# Patient Record
(demographics unavailable — no encounter records)

---

## 2024-10-02 NOTE — HISTORY OF PRESENT ILLNESS
[FreeTextEntry1] : 77-year-old man with a very complex history.  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 He also has a past medical history of hypertension; hyperlipidemia; seizure disorder; throat carcinoma status post radiation; abdominal aneurysm repair; MI status post CABG, on aspirin; and right-sided carotid endarterectomy at 2017 as well as hemicolectomy and cholecystectomy.  Presented to outside hospital after he developed a left temporal fistula.  There was suspicion for skin cancer.  Biopsy showed granulation tissue, and fascia was tracking down all the way to previous cranioplasty.  He underwent direct browlift and upper blepharoplasty and revision of the left cranioplasty using Rj titanium mesh at an outside hospital as well as temporalis flap and mineralized bone matrix.  He then underwent initial operation for local flap on 9/19 for removal of the mesh and admitted to [facility] for antibiotic treatment.  After 4 days, the flap became dusky necrotic with purulent material discharge the surgeon at OSH returned to the OR on 10/1 for removal of the mesh, debridement of bone matrix, but the scalp tension was too high and the surgeon at the outside hospital could not close it.  All cultures from [facility] were growing proteus mirabilis, and he was on broad-spectrum antibiotics.  The patient was referred to the Neuroplastic service at [facility] for workup and management of this complicated scalp wound, wound dehiscence, intracranial abscess, as well as epidural empyema and osteomyelitis. Referred by Dr. Luis Black  On 10/11/2023 underwent Resection of damaged scalp surrounding the open foul smelling draining wound, Removal of old infected polymethyl methacrylate cranial implant that was placed over a large left frontotemporoparietal defect at his original craniotomy in the 60s. Creation of complex left-sided pedicle scalp flap for midline rotation based on the left superficial temporal artery and occipital artery to fill in half of the scalp defect and Full-thickness skin graft harvested from the abdomen to cover the right superior donor site. patient Tolerated the procedure well and discharged to rehab on Ceftriaxone 2 grams IV q12. Vancomycin 1 gram IV q12 + Metronidazole 500 mg PO q8hrs followed by Dr. Murillo.  I have communicated frequently with his provider at [facility] Dr. Tim Causey who sent daily pictures of scalp. Postop course complicated by skin graft hematoma since the pt put on Lovenox and Aspirin and daily fitting his helmet have likely irritated the skin graft. No signs of infections no history of fall.  so he underwent a resection of damaged scalp and with full-thickness skin graft 11/8/23 and was discharged to [facility]. healing well, completed month of PT, healing appropriately.  He comes in for routine postop visit.  Abdominal donor site c/d/i A&Ox3  6/18/24: Returns TODAY for follow up and to discuss cranioplasty options with his siblings Pavan and Sayda. Remains at [facility] (@ [facility]). Endorses unable to walk, wheelchair bound. Has not walked in about a year. Continuing with therapies at rehab, however family is very dissatisfied from the care, as they have not seen any progress, just sitting in chair. Was told he will never be able to walk again or back to his pre-infection baseline.   Scalp: Head shape appears asymmetrical. Scalp incision sites are well healed without erythema, dehiscence, drainage, or signs of infection. Edges are well-approximated. No other erythema. No fluctuance or palpable fluid collections. Wearing helmet when OOB.  A&Ox3, hard of hearing. Family members assist in ROS/HPI.   7/9/24: Spoke with Pio's sister Virginia via phone call. Patient still at [facility], had CT and MRI completed. Discussed risks and benefits of surgery. [facility]   Patient met with Dr. Ross on 7/30/24, d/t pt with very thin skin graft overlying the bone and the risk of dehiscence over the implant would be high, recommendation for  tissue expansion on the right side with staged adjacent tissue transfer of scalp to left with excision of skin graft at the time of cranioplasty was discussed.   He is s/p subgaleal tissue expander 8/2/24.  8/20/24: Returns today for routine post op follow up. Remains at [facility].  Injected 20cc sterile saline into expander today. Discussed at today's visit with patient and family that he likely will not be a candidate for acute rehab post reconstruction.  Scalp: Head shape appears symmetrical. Nylon sutures in place along incision. Scalp incision sites are healing appropriately without erythema, dehiscence, drainage, or signs of infection. Edges are well-approximated. No other erythema. No fluctuance or palpable fluid collections. A&Ox3  Now s/p cranioplasty w/ R flank skin graft on 8/30/24. Hospital course c/b: urinary retention, paniagua placed, will plan to TOV at rehab; increased secretions, seen by pulmonology and improved with dunoebs and chest PT; agitation and removing headwrap, patient was frequently re-directed. briefly required time on mittens and 1:1 supervision, intermittent zyprexa but has since resolved.  He was discharged back to [facility] with specific instructions for staff: "apply mupirocin to graft site (head) and crani incision twice daily. Graft site should be moist. Please keep head wrap in place and changed twice daily."  9/25/24: Returns today for post op check.  10/10/24: Returns today for wound check.

## 2024-10-02 NOTE — ASSESSMENT
[FreeTextEntry1] : 77 year old male with a very complex history most recently s/p wound revision with removal of hardware, craniectomy for osteomyelitis, washout of epidural and intracranial abscess Resection of damaged scalp surrounding the open foul smelling draining wound. Creation of complex left-sided pedicle scalp flap for midline rotation and Full-thickness skin graft harvested from the abdomen to cover the right superior donor site. Postop course complicated by delayed hematoma under the skin graft in the setting of helmet fitting with Aspirin and Lovenox. He is now s/p Resection of damaged and infected full-thickness skin graft 11/8/23. More recently s/p scalp tissue expander on 8/2/24 and cranioplasty with skin graft on 8/30/24.   PLAN: -    Patients family member verbalized understanding and agreement with treatment plan.

## 2024-10-20 NOTE — HISTORY OF PRESENT ILLNESS
[FreeTextEntry1] : 77-year-old man with a very complex history.  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 He also has a past medical history of hypertension; hyperlipidemia; seizure disorder; throat carcinoma status post radiation; abdominal aneurysm repair; MI status post CABG, on aspirin; and right-sided carotid endarterectomy at 2017 as well as hemicolectomy and cholecystectomy.  Presented to outside hospital after he developed a left temporal fistula.  There was suspicion for skin cancer.  Biopsy showed granulation tissue, and fascia was tracking down all the way to previous cranioplasty.  He underwent direct browlift and upper blepharoplasty and revision of the left cranioplasty using Rj titanium mesh at an outside hospital as well as temporalis flap and mineralized bone matrix.  He then underwent initial operation for local flap on 9/19 for removal of the mesh and admitted to [facility] for antibiotic treatment.  After 4 days, the flap became dusky necrotic with purulent material discharge the surgeon at [facility] returned to the OR on 10/1 for removal of the mesh, debridement of bone matrix, but the scalp tension was too high and the surgeon at the outside hospital could not close it.  All cultures from [facility] were growing proteus mirabilis, and he was on broad-spectrum antibiotics.  The patient was referred to the Neuroplastic service at [facility] for workup and management of this complicated scalp wound, wound dehiscence, intracranial abscess, as well as epidural empyema and osteomyelitis. Referred by Dr. Luis Black  On 10/11/2023 underwent Resection of damaged scalp surrounding the open foul smelling draining wound, Removal of old infected polymethyl methacrylate cranial implant that was placed over a large left frontotemporoparietal defect at his original craniotomy in the 60s. Creation of complex left-sided pedicle scalp flap for midline rotation based on the left superficial temporal artery and occipital artery to fill in half of the scalp defect and Full-thickness skin graft harvested from the abdomen to cover the right superior donor site. patient Tolerated the procedure well and discharged to rehab on Ceftriaxone 2 grams IV q12. Vancomycin 1 gram IV q12 + Metronidazole 500 mg PO q8hrs followed by Dr. Murillo.  I have communicated frequently with his provider at [place] Dr. Tim Causey who sent daily pictures of scalp. Postop course complicated by skin graft hematoma since the pt put on Lovenox and Aspirin and daily fitting his helmet have likely irritated the skin graft. No signs of infections no history of fall.  so he underwent a resection of damaged scalp and with full-thickness skin graft 11/8/23 and was discharged to [facility]. healing well, completed month of PT, healing appropriately.  He comes in for routine postop visit.  Abdominal donor site c/d/i A&Ox3  6/18/24: Returns TODAY for follow up and to discuss cranioplasty options with his siblings Pavan and Sadya. Remains at [facility] (@ [place]). Endorses unable to walk, wheelchair bound. Has not walked in about a year. Continuing with therapies at rehab, however family is very dissatisfied from the care, as they have not seen any progress, just sitting in chair. Was told he will never be able to walk again or back to his pre-infection baseline.   Scalp: Head shape appears asymmetrical. Scalp incision sites are well healed without erythema, dehiscence, drainage, or signs of infection. Edges are well-approximated. No other erythema. No fluctuance or palpable fluid collections. Wearing helmet when OOB.  A&Ox3, hard of hearing. Family members assist in ROS/HPI.   7/9/24: Spoke with Pio's sister Virginia via phone call. Patient still at [facility], had CT and MRI completed. Discussed risks and benefits of surgery. [facility]   Patient met with Dr. Ross on 7/30/24, d/t pt with very thin skin graft overlying the bone and the risk of dehiscence over the implant would be high, recommendation for tissue expansion on the right side with staged adjacent tissue transfer of scalp to left with excision of skin graft at the time of cranioplasty was discussed.   He is s/p subgaleal tissue expander 8/2/24.  8/20/24: Returns today for routine post op follow up. Remains at [facility] [place]. Injected 20cc sterile saline into expander today. Discussed at today's visit with patient and family that he likely will not be a candidate for acute rehab post reconstruction.  Scalp: Head shape appears symmetrical. Nylon sutures in place along incision. Scalp incision sites are healing appropriately without erythema, dehiscence, drainage, or signs of infection. Edges are well-approximated. No other erythema. No fluctuance or palpable fluid collections. A&Ox3  Now s/p cranioplasty w/ R flank skin graft on 8/30/24. unfortunetaly d/t do delirium patient removed bolster on day 5 after skin graft resulting in hematoma at the surgical bed and expcted graft failure.  Hospital course c/b: urinary retention, paniagua placed, will plan to TOV at rehab; increased secretions, seen by pulmonology and improved with dunoebs and chest PT; agitation and removing headwrap, patient was frequently re-directed. briefly required time on mittens and 1:1 supervision, intermittent zyprexa but has since resolved.  He was discharged back to [facility] [place] with specific instructions for staff: "apply mupirocin to graft site (head) and crani incision twice daily. Graft site should be moist. Please keep head wrap in place and changed twice daily."  9/25/24: Returns today for post op check.  10/15/24: Returns today for wound check with sister Virginia. Per sister, she is unsure if they are keeping skin graft moist. local wound care applied, large black scab removed with healthy granulation tissue beneath.

## 2024-10-20 NOTE — HISTORY OF PRESENT ILLNESS
[FreeTextEntry1] : 77-year-old man with a very complex history.  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 He also has a past medical history of hypertension; hyperlipidemia; seizure disorder; throat carcinoma status post radiation; abdominal aneurysm repair; MI status post CABG, on aspirin; and right-sided carotid endarterectomy at 2017 as well as hemicolectomy and cholecystectomy.  Presented to outside hospital after he developed a left temporal fistula.  There was suspicion for skin cancer.  Biopsy showed granulation tissue, and fascia was tracking down all the way to previous cranioplasty.  He underwent direct browlift and upper blepharoplasty and revision of the left cranioplasty using Rj titanium mesh at an outside hospital as well as temporalis flap and mineralized bone matrix.  He then underwent initial operation for local flap on 9/19 for removal of the mesh and admitted to [facility] for antibiotic treatment.  After 4 days, the flap became dusky necrotic with purulent material discharge the surgeon at O[facility] returned to the OR on 10/1 for removal of the mesh, debridement of bone matrix, but the scalp tension was too high and the surgeon at the outside hospital could not close it.  All cultures from [facility] were growing proteus mirabilis, and he was on broad-spectrum antibiotics.  The patient was referred to the Neuroplastic service at [facility] for workup and management of this complicated scalp wound, wound dehiscence, intracranial abscess, as well as epidural empyema and osteomyelitis. Referred by Dr. Luis Black  On 10/11/2023 underwent Resection of damaged scalp surrounding the open foul smelling draining wound, Removal of old infected polymethyl methacrylate cranial implant that was placed over a large left frontotemporoparietal defect at his original craniotomy in the 60s. Creation of complex left-sided pedicle scalp flap for midline rotation based on the left superficial temporal artery and occipital artery to fill in half of the scalp defect and Full-thickness skin graft harvested from the abdomen to cover the right superior donor site. patient Tolerated the procedure well and discharged to rehab on Ceftriaxone 2 grams IV q12. Vancomycin 1 gram IV q12 + Metronidazole 500 mg PO q8hrs followed by Dr. Murillo.  I have communicated frequently with his provider at [place] Dr. Tim Causey who sent daily pictures of scalp. Postop course complicated by skin graft hematoma since the pt put on Lovenox and Aspirin and daily fitting his helmet have likely irritated the skin graft. No signs of infections no history of fall.  so he underwent a resection of damaged scalp and with full-thickness skin graft 11/8/23 and was discharged to [facility]. healing well, completed month of PT, healing appropriately.  He comes in for routine postop visit.  Abdominal donor site c/d/i A&Ox3  6/18/24: Returns TODAY for follow up and to discuss cranioplasty options with his siblings Pavan and Sayda. Remains at [facility] (@ [place]). Endorses unable to walk, wheelchair bound. Has not walked in about a year. Continuing with therapies at rehab, however family is very dissatisfied from the care, as they have not seen any progress, just sitting in chair. Was told he will never be able to walk again or back to his pre-infection baseline.   Scalp: Head shape appears asymmetrical. Scalp incision sites are well healed without erythema, dehiscence, drainage, or signs of infection. Edges are well-approximated. No other erythema. No fluctuance or palpable fluid collections. Wearing helmet when OOB.  A&Ox3, hard of hearing. Family members assist in ROS/HPI.   7/9/24: Spoke with Pio's sister [place] via phone call. Patient still at [facility], had CT and MRI completed. Discussed risks and benefits of surgery. [facility]   Patient met with Dr. Ross on 7/30/24, d/t pt with very thin skin graft overlying the bone and the risk of dehiscence over the implant would be high, recommendation for tissue expansion on the right side with staged adjacent tissue transfer of scalp to left with excision of skin graft at the time of cranioplasty was discussed.   He is s/p subgaleal tissue expander 8/2/24.  8/20/24: Returns today for routine post op follow up. Remains at [facility] [place]. Injected 20cc sterile saline into expander today. Discussed at today's visit with patient and family that he likely will not be a candidate for acute rehab post reconstruction.  Scalp: Head shape appears symmetrical. Nylon sutures in place along incision. Scalp incision sites are healing appropriately without erythema, dehiscence, drainage, or signs of infection. Edges are well-approximated. No other erythema. No fluctuance or palpable fluid collections. A&Ox3  Now s/p cranioplasty w/ R flank skin graft on 8/30/24. unfortunetaly d/t do delirium patient removed bolster on day 5 after skin graft resulting in hematoma at the surgical bed and expcted graft failure.  Hospital course c/b: urinary retention, paniagua placed, will plan to TOV at rehab; increased secretions, seen by pulmonology and improved with dunoebs and chest PT; agitation and removing headwrap, patient was frequently re-directed. briefly required time on mittens and 1:1 supervision, intermittent zyprexa but has since resolved.  He was discharged back to [facility] [place] with specific instructions for staff: "apply mupirocin to graft site (head) and crani incision twice daily. Graft site should be moist. Please keep head wrap in place and changed twice daily."  9/25/24: Returns today for post op check.  10/15/24: Returns today for wound check with sister [place]. Per sister, she is unsure if they are keeping skin graft moist. local wound care applied, large black scab removed with healthy granulation tissue beneath.

## 2024-10-20 NOTE — PHYSICAL EXAM
[General Appearance - Alert] : alert [Sclera] : the sclera and conjunctiva were normal [Outer Ear] : the ears and nose were normal in appearance [Neck Appearance] : the appearance of the neck was normal [] : no respiratory distress [Skin Color & Pigmentation] : normal skin color and pigmentation [FreeTextEntry1] : FTSG

## 2024-10-20 NOTE — HISTORY OF PRESENT ILLNESS
[FreeTextEntry1] : 77-year-old man with a very complex history.  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 He also has a past medical history of hypertension; hyperlipidemia; seizure disorder; throat carcinoma status post radiation; abdominal aneurysm repair; MI status post CABG, on aspirin; and right-sided carotid endarterectomy at 2017 as well as hemicolectomy and cholecystectomy.  Presented to outside hospital after he developed a left temporal fistula.  There was suspicion for skin cancer.  Biopsy showed granulation tissue, and fascia was tracking down all the way to previous cranioplasty.  He underwent direct browlift and upper blepharoplasty and revision of the left cranioplasty using Rj titanium mesh at an outside hospital as well as temporalis flap and mineralized bone matrix.  He then underwent initial operation for local flap on 9/19 for removal of the mesh and admitted to [facility] for antibiotic treatment.  After 4 days, the flap became dusky necrotic with purulent material discharge the surgeon at OSH returned to the OR on 10/1 for removal of the mesh, debridement of bone matrix, but the scalp tension was too high and the surgeon at the outside hospital could not close it.  All cultures from [facility] were growing proteus mirabilis, and he was on broad-spectrum antibiotics.  The patient was referred to the Neuroplastic service at [facility] for workup and management of this complicated scalp wound, wound dehiscence, intracranial abscess, as well as epidural empyema and osteomyelitis. Referred by Dr. Luis Black  On 10/11/2023 underwent Resection of damaged scalp surrounding the open foul smelling draining wound, Removal of old infected polymethyl methacrylate cranial implant that was placed over a large left frontotemporoparietal defect at his original craniotomy in the 60s. Creation of complex left-sided pedicle scalp flap for midline rotation based on the left superficial temporal artery and occipital artery to fill in half of the scalp defect and Full-thickness skin graft harvested from the abdomen to cover the right superior donor site. patient Tolerated the procedure well and discharged to rehab on Ceftriaxone 2 grams IV q12. Vancomycin 1 gram IV q12 + Metronidazole 500 mg PO q8hrs followed by Dr. Murillo.  I have communicated frequently with his provider at [place] Dr. Tim Causey who sent daily pictures of scalp. Postop course complicated by skin graft hematoma since the pt put on Lovenox and Aspirin and daily fitting his helmet have likely irritated the skin graft. No signs of infections no history of fall.  so he underwent a resection of damaged scalp and with full-thickness skin graft 11/8/23 and was discharged to [facility]. healing well, completed month of PT, healing appropriately.  He comes in for routine postop visit.  Abdominal donor site c/d/i A&Ox3  6/18/24: Returns TODAY for follow up and to discuss cranioplasty options with his siblings Pavan and Sayda. Remains at [facility] (@ [place]). Endorses unable to walk, wheelchair bound. Has not walked in about a year. Continuing with therapies at rehab, however family is very dissatisfied from the care, as they have not seen any progress, just sitting in chair. Was told he will never be able to walk again or back to his pre-infection baseline.   Scalp: Head shape appears asymmetrical. Scalp incision sites are well healed without erythema, dehiscence, drainage, or signs of infection. Edges are well-approximated. No other erythema. No fluctuance or palpable fluid collections. Wearing helmet when OOB.  A&Ox3, hard of hearing. Family members assist in ROS/HPI.   7/9/24: Spoke with Pio's sister Virginia via phone call. Patient still at [facility], had CT and MRI completed. Discussed risks and benefits of surgery. [facility]   Patient met with Dr. Ross on 7/30/24, d/t pt with very thin skin graft overlying the bone and the risk of dehiscence over the implant would be high, recommendation for tissue expansion on the right side with staged adjacent tissue transfer of scalp to left with excision of skin graft at the time of cranioplasty was discussed.   He is s/p subgaleal tissue expander 8/2/24.  8/20/24: Returns today for routine post op follow up. Remains at [facility] at [place]. Injected 20cc sterile saline into expander today. Discussed at today's visit with patient and family that he likely will not be a candidate for acute rehab post reconstruction.  Scalp: Head shape appears symmetrical. Nylon sutures in place along incision. Scalp incision sites are healing appropriately without erythema, dehiscence, drainage, or signs of infection. Edges are well-approximated. No other erythema. No fluctuance or palpable fluid collections. A&Ox3  Now s/p cranioplasty w/ R flank skin graft on 8/30/24. unfortunetaly d/t do delirium patient removed bolster on day 5 after skin graft resulting in hematoma at the surgical bed and expcted graft failure.  Hospital course c/b: urinary retention, paniagua placed, will plan to TOV at rehab; increased secretions, seen by pulmonology and improved with dunoebs and chest PT; agitation and removing headwrap, patient was frequently re-directed. briefly required time on mittens and 1:1 supervision, intermittent zyprexa but has since resolved.  He was discharged back to [facility] [place] with specific instructions for staff: "apply mupirocin to graft site (head) and crani incision twice daily. Graft site should be moist. Please keep head wrap in place and changed twice daily."  9/25/24: Returns today for post op check.  10/15/24: Returns today for wound check with sister Virginia. Per sister, she is unsure if they are keeping skin graft moist. local wound care applied, large black scab removed with healthy granulation tissue beneath.

## 2024-10-20 NOTE — ASSESSMENT
[FreeTextEntry1] : 77 year old male with a very complex history most recently s/p wound revision with removal of hardware, craniectomy for osteomyelitis, washout of epidural and intracranial abscess Resection of damaged scalp surrounding the open foul smelling draining wound. Creation of complex left-sided pedicle scalp flap for midline rotation and Full-thickness skin graft harvested from the abdomen to cover the right superior donor site. Postop course complicated by delayed hematoma under the skin graft in the setting of helmet fitting with Aspirin and Lovenox. He is now s/p Resection of damaged and infected full-thickness skin graft 11/8/23. More recently s/p scalp tissue expander on 8/2/24 and Titanium mesh cranioplasty, rotational flap creation and full thickness skin graft from abdomen on 8/30/24.  unfortunetaly course complicated by delirium and patient ripped his bolster resulting in hematoma under the skin graft. He was then discharged to [facility] that did not comply with wound care recommendations. Family is is dissatisfied with the [facility].  Presented today for wound check, scalp healing well, head is well reconstructed, wound c/d/i. On the right FTSG black scab removed, under the scab healthy granulation tissue.  I had a long discussion with his siblings Donna and Pavan about their expectations and explained that lack of adherent or complying with management can cause repeated wound issues. Offered additional skin graft if Pavan and [place] will participate in [facility] to avoid recurrent graft failure.  We discussed the details of the FTSG revision, the expected recovery period, and the expected outcome. We discussed the likelihood of satisfaction after complete recovery, and the potential causes of dissatisfaction. The importance of active patient participation in the rehabilitation and post op care was emphasized, along with its influence on short and long-term outcomes. Specific risks of revision were discussed in detail. We discussed the risk of surgical site complications including but not limited to: surgical site infection, wound healing complications, neurovascular injury, hemorrhage, persistent pain and need for reoperation.  PLAN: -send to ER today for admission for failed skin graft  -plan to send back to rehab post surgery  -need med clearance, CT head, CT chest, stop heparin    Patient and family member verbalized understanding and agreement with treatment plan.   I, Dr. Aguilera, personally performed the evaluation and management (E/M) services for this patient. That E/M includes conducting the initial examination, assessing all conditions, and establishing the plan of care.

## 2024-10-20 NOTE — ASSESSMENT
[FreeTextEntry1] : 77 year old male with a very complex history most recently s/p wound revision with removal of hardware, craniectomy for osteomyelitis, washout of epidural and intracranial abscess Resection of damaged scalp surrounding the open foul smelling draining wound. Creation of complex left-sided pedicle scalp flap for midline rotation and Full-thickness skin graft harvested from the abdomen to cover the right superior donor site. Postop course complicated by delayed hematoma under the skin graft in the setting of helmet fitting with Aspirin and Lovenox. He is now s/p Resection of damaged and infected full-thickness skin graft 11/8/23. More recently s/p scalp tissue expander on 8/2/24 and Titanium mesh cranioplasty, rotational flap creation and full thickness skin graft from abdomen on 8/30/24.  unfortunetaly course complicated by delirium and patient ripped his bolster resulting in hematoma under the skin graft. He was then discharged to [facility] that did not comply with wound care recommendations. Family is is dissatisfied with the [facility].  Presented today for wound check, scalp healing well, head is well reconstructed, wound c/d/i. On the right FTSG black scab removed, under the scab healthy granulation tissue.  I had a long discussion with his siblings Donna and Pavan about their expectations and explained that lack of adherent or complying with management can cause repeated wound issues. Offered additional skin graft if Pavan and [place] will participate in [facility] to avoid recurrent graft failure.  We discussed the details of the FTSG revision, the expected recovery period, and the expected outcome. We discussed the likelihood of satisfaction after complete recovery, and the potential causes of dissatisfaction. The importance of active patient participation in the rehabilitation and post op care was emphasized, along with its influence on short and long-term outcomes. Specific risks of revision were discussed in detail. We discussed the risk of surgical site complications including but not limited to: surgical site infection, wound healing complications, neurovascular injury, hemorrhage, persistent pain and need for reoperation.  PLAN: -send to ER today for admission for failed skin graft  -plan to send back to rehab post surgery  -need med clearance, CT head, CT chest, stop heparin    Patient and family member verbalized understanding and agreement with treatment plan.   I, Dr. Aguilera, personally performed the evaluation and management (E/M) services for this patient. That E/M includes conducting the initial examination, assessing all conditions, and establishing the plan of care.

## 2024-10-20 NOTE — HISTORY OF PRESENT ILLNESS
[FreeTextEntry1] : 77-year-old man with a very complex history.  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 He also has a past medical history of hypertension; hyperlipidemia; seizure disorder; throat carcinoma status post radiation; abdominal aneurysm repair; MI status post CABG, on aspirin; and right-sided carotid endarterectomy at 2017 as well as hemicolectomy and cholecystectomy.  Presented to outside hospital after he developed a left temporal fistula.  There was suspicion for skin cancer.  Biopsy showed granulation tissue, and fascia was tracking down all the way to previous cranioplasty.  He underwent direct browlift and upper blepharoplasty and revision of the left cranioplasty using Rj titanium mesh at an outside hospital as well as temporalis flap and mineralized bone matrix.  He then underwent initial operation for local flap on 9/19 for removal of the mesh and admitted to [facility] for antibiotic treatment.  After 4 days, the flap became dusky necrotic with purulent material discharge the surgeon at [facility]SH returned to the OR on 10/1 for removal of the mesh, debridement of bone matrix, but the scalp tension was too high and the surgeon at the outside hospital could not close it.  All cultures from [facility] were growing proteus mirabilis, and he was on broad-spectrum antibiotics.  The patient was referred to the Neuroplastic service at [facility] for workup and management of this complicated scalp wound, wound dehiscence, intracranial abscess, as well as epidural empyema and osteomyelitis. Referred by Dr. Luis Black  On 10/11/2023 underwent Resection of damaged scalp surrounding the open foul smelling draining wound, Removal of old infected polymethyl methacrylate cranial implant that was placed over a large left frontotemporoparietal defect at his original craniotomy in the 60s. Creation of complex left-sided pedicle scalp flap for midline rotation based on the left superficial temporal artery and occipital artery to fill in half of the scalp defect and Full-thickness skin graft harvested from the abdomen to cover the right superior donor site. patient Tolerated the procedure well and discharged to rehab on Ceftriaxone 2 grams IV q12. Vancomycin 1 gram IV q12 + Metronidazole 500 mg PO q8hrs followed by Dr. Murillo.  I have communicated frequently with his provider at [place] Dr. Tim Causey who sent daily pictures of scalp. Postop course complicated by skin graft hematoma since the pt put on Lovenox and Aspirin and daily fitting his helmet have likely irritated the skin graft. No signs of infections no history of fall.  so he underwent a resection of damaged scalp and with full-thickness skin graft 11/8/23 and was discharged to [facility]. healing well, completed month of PT, healing appropriately.  He comes in for routine postop visit.  Abdominal donor site c/d/i A&Ox3  6/18/24: Returns TODAY for follow up and to discuss cranioplasty options with his siblings Pavan and Sayda. Remains at [facility] (@ [place]). Endorses unable to walk, wheelchair bound. Has not walked in about a year. Continuing with therapies at rehab, however family is very dissatisfied from the care, as they have not seen any progress, just sitting in chair. Was told he will never be able to walk again or back to his pre-infection baseline.   Scalp: Head shape appears asymmetrical. Scalp incision sites are well healed without erythema, dehiscence, drainage, or signs of infection. Edges are well-approximated. No other erythema. No fluctuance or palpable fluid collections. Wearing helmet when OOB.  A&Ox3, hard of hearing. Family members assist in ROS/HPI.   7/9/24: Spoke with Pio's sister [place] via phone call. Patient still at [facility], had CT and MRI completed. Discussed risks and benefits of surgery. [facility]   Patient met with Dr. Ross on 7/30/24, d/t pt with very thin skin graft overlying the bone and the risk of dehiscence over the implant would be high, recommendation for tissue expansion on the right side with staged adjacent tissue transfer of scalp to left with excision of skin graft at the time of cranioplasty was discussed.   He is s/p subgaleal tissue expander 8/2/24.  8/20/24: Returns today for routine post op follow up. Remains at [facility] [place]. Injected 20cc sterile saline into expander today. Discussed at today's visit with patient and family that he likely will not be a candidate for acute rehab post reconstruction.  Scalp: Head shape appears symmetrical. Nylon sutures in place along incision. Scalp incision sites are healing appropriately without erythema, dehiscence, drainage, or signs of infection. Edges are well-approximated. No other erythema. No fluctuance or palpable fluid collections. A&Ox3  Now s/p cranioplasty w/ R flank skin graft on 8/30/24. unfortunetaly d/t do delirium patient removed bolster on day 5 after skin graft resulting in hematoma at the surgical bed and expcted graft failure.  Hospital course c/b: urinary retention, paniagua placed, will plan to TOV at rehab; increased secretions, seen by pulmonology and improved with dunoebs and chest PT; agitation and removing headwrap, patient was frequently re-directed. briefly required time on mittens and 1:1 supervision, intermittent zyprexa but has since resolved.  He was discharged back to [facility] [place] with specific instructions for staff: "apply mupirocin to graft site (head) and crani incision twice daily. Graft site should be moist. Please keep head wrap in place and changed twice daily."  9/25/24: Returns today for post op check.  10/15/24: Returns today for wound check with sister [place]. Per sister, she is unsure if they are keeping skin graft moist. local wound care applied, large black scab removed with healthy granulation tissue beneath.

## 2024-10-20 NOTE — HISTORY OF PRESENT ILLNESS
[FreeTextEntry1] : 77-year-old man with a very complex history.  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 He also has a past medical history of hypertension; hyperlipidemia; seizure disorder; throat carcinoma status post radiation; abdominal aneurysm repair; MI status post CABG, on aspirin; and right-sided carotid endarterectomy at 2017 as well as hemicolectomy and cholecystectomy.  Presented to outside hospital after he developed a left temporal fistula.  There was suspicion for skin cancer.  Biopsy showed granulation tissue, and fascia was tracking down all the way to previous cranioplasty.  He underwent direct browlift and upper blepharoplasty and revision of the left cranioplasty using Rj titanium mesh at an outside hospital as well as temporalis flap and mineralized bone matrix.  He then underwent initial operation for local flap on 9/19 for removal of the mesh and admitted to [facility] for antibiotic treatment.  After 4 days, the flap became dusky necrotic with purulent material discharge the surgeon at OSH returned to the OR on 10/1 for removal of the mesh, debridement of bone matrix, but the scalp tension was too high and the surgeon at the outside hospital could not close it.  All cultures from [facility] were growing proteus mirabilis, and he was on broad-spectrum antibiotics.  The patient was referred to the Neuroplastic service at [facility] for workup and management of this complicated scalp wound, wound dehiscence, intracranial abscess, as well as epidural empyema and osteomyelitis. Referred by Dr. Luis Black  On 10/11/2023 underwent Resection of damaged scalp surrounding the open foul smelling draining wound, Removal of old infected polymethyl methacrylate cranial implant that was placed over a large left frontotemporoparietal defect at his original craniotomy in the 60s. Creation of complex left-sided pedicle scalp flap for midline rotation based on the left superficial temporal artery and occipital artery to fill in half of the scalp defect and Full-thickness skin graft harvested from the abdomen to cover the right superior donor site. patient Tolerated the procedure well and discharged to rehab on Ceftriaxone 2 grams IV q12. Vancomycin 1 gram IV q12 + Metronidazole 500 mg PO q8hrs followed by Dr. Murillo.  I have communicated frequently with his provider at [place] Dr. Tim Causey who sent daily pictures of scalp. Postop course complicated by skin graft hematoma since the pt put on Lovenox and Aspirin and daily fitting his helmet have likely irritated the skin graft. No signs of infections no history of fall.  so he underwent a resection of damaged scalp and with full-thickness skin graft 11/8/23 and was discharged to [facility]. healing well, completed month of PT, healing appropriately.  He comes in for routine postop visit.  Abdominal donor site c/d/i A&Ox3  6/18/24: Returns TODAY for follow up and to discuss cranioplasty options with his siblings Pavan and Sayda. Remains at [facility] (@ [place]). Endorses unable to walk, wheelchair bound. Has not walked in about a year. Continuing with therapies at rehab, however family is very dissatisfied from the care, as they have not seen any progress, just sitting in chair. Was told he will never be able to walk again or back to his pre-infection baseline.   Scalp: Head shape appears asymmetrical. Scalp incision sites are well healed without erythema, dehiscence, drainage, or signs of infection. Edges are well-approximated. No other erythema. No fluctuance or palpable fluid collections. Wearing helmet when OOB.  A&Ox3, hard of hearing. Family members assist in ROS/HPI.   7/9/24: Spoke with Pio's sister [place] via phone call. Patient still at [facility], had CT and MRI completed. Discussed risks and benefits of surgery. [facility]   Patient met with Dr. Ross on 7/30/24, d/t pt with very thin skin graft overlying the bone and the risk of dehiscence over the implant would be high, recommendation for tissue expansion on the right side with staged adjacent tissue transfer of scalp to left with excision of skin graft at the time of cranioplasty was discussed.   He is s/p subgaleal tissue expander 8/2/24.  8/20/24: Returns today for routine post op follow up. Remains at [facility] [place]. Injected 20cc sterile saline into expander today. Discussed at today's visit with patient and family that he likely will not be a candidate for acute rehab post reconstruction.  Scalp: Head shape appears symmetrical. Nylon sutures in place along incision. Scalp incision sites are healing appropriately without erythema, dehiscence, drainage, or signs of infection. Edges are well-approximated. No other erythema. No fluctuance or palpable fluid collections. A&Ox3  Now s/p cranioplasty w/ R flank skin graft on 8/30/24. unfortunetaly d/t do delirium patient removed bolster on day 5 after skin graft resulting in hematoma at the surgical bed and expcted graft failure.  Hospital course c/b: urinary retention, paniagua placed, will plan to TOV at rehab; increased secretions, seen by pulmonology and improved with dunoebs and chest PT; agitation and removing headwrap, patient was frequently re-directed. briefly required time on mittens and 1:1 supervision, intermittent zyprexa but has since resolved.  He was discharged back to [facility] [place] with specific instructions for staff: "apply mupirocin to graft site (head) and crani incision twice daily. Graft site should be moist. Please keep head wrap in place and changed twice daily."  9/25/24: Returns today for post op check.  10/15/24: Returns today for wound check with sister [place]. Per sister, she is unsure if they are keeping skin graft moist. local wound care applied, large black scab removed with healthy granulation tissue beneath.

## 2024-10-20 NOTE — ASSESSMENT
[FreeTextEntry1] : 77 year old male with a very complex history most recently s/p wound revision with removal of hardware, craniectomy for osteomyelitis, washout of epidural and intracranial abscess Resection of damaged scalp surrounding the open foul smelling draining wound. Creation of complex left-sided pedicle scalp flap for midline rotation and Full-thickness skin graft harvested from the abdomen to cover the right superior donor site. Postop course complicated by delayed hematoma under the skin graft in the setting of helmet fitting with Aspirin and Lovenox. He is now s/p Resection of damaged and infected full-thickness skin graft 11/8/23. More recently s/p scalp tissue expander on 8/2/24 and Titanium mesh cranioplasty, rotational flap creation and full thickness skin graft from abdomen on 8/30/24.  unfortunetaly course complicated by delirium and patient ripped his bolster resulting in hematoma under the skin graft. He was then discharged to [facility] that did not comply with wound care recommendations. Family is is dissatisfied with the [facility].  Presented today for wound check, scalp healing well, head is well reconstructed, wound c/d/i. On the right FTSG black scab removed, under the scab healthy granulation tissue.  I had a long discussion with his siblings Donna and Pavan about their expectations and explained that lack of adherent or complying with management can cause repeated wound issues. Offered additional skin graft if Pavan and [place] will participate in [facility] to avoid recurrent graft failure.  We discussed the details of the FTSG revision, the expected recovery period, and the expected outcome. We discussed the likelihood of satisfaction after complete recovery, and the potential causes of dissatisfaction. The importance of active patient participation in the rehabilitation and post op care was emphasized, along with its influence on short and long-term outcomes. Specific risks of revision were discussed in detail. We discussed the risk of surgical site complications including but not limited to: surgical site infection, wound healing complications, neurovascular injury, hemorrhage, persistent pain and need for reoperation.  PLAN: -send to ER today for admission for failed skin graft  -plan to send back to rehab post surgery  -need med clearance, CT head, CT chest, stop heparin    Patient and family member verbalized understanding and agreement with treatment plan.   I, Dr. Aguilera, personally performed the evaluation and management (E/M) services for this patient. That E/M includes conducting the initial examination, assessing all conditions, and establishing the plan of care.

## 2024-10-30 NOTE — HISTORY OF PRESENT ILLNESS
[FreeTextEntry1] : 77-year-old man with a very complex history.  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 He also has a past medical history of hypertension; hyperlipidemia; seizure disorder; throat carcinoma status post radiation; abdominal aneurysm repair; MI status post CABG, on aspirin; and right-sided carotid endarterectomy at 2017 as well as hemicolectomy and cholecystectomy.  Presented to outside hospital after he developed a left temporal fistula.  There was suspicion for skin cancer.  Biopsy showed granulation tissue, and fascia was tracking down all the way to previous cranioplasty.  He underwent direct browlift and upper blepharoplasty and revision of the left cranioplasty using Rj titanium mesh at an outside hospital as well as temporalis flap and mineralized bone matrix.  He then underwent initial operation for local flap on 9/19 for removal of the mesh and admitted to [facility] for antibiotic treatment.  After 4 days, the flap became dusky necrotic with purulent material discharge the surgeon at OSH returned to the OR on 10/1 for removal of the mesh, debridement of bone matrix, but the scalp tension was too high and the surgeon at the outside hospital could not close it.  All cultures from [facility] were growing proteus mirabilis, and he was on broad-spectrum antibiotics.  The patient was referred to the Neuroplastic service at [facility] for workup and management of this complicated scalp wound, wound dehiscence, intracranial abscess, as well as epidural empyema and osteomyelitis. Referred by Dr. Luis Black  On 10/11/2023 underwent Resection of damaged scalp surrounding the open foul smelling draining wound, Removal of old infected polymethyl methacrylate cranial implant that was placed over a large left frontotemporoparietal defect at his original craniotomy in the 60s. Creation of complex left-sided pedicle scalp flap for midline rotation based on the left superficial temporal artery and occipital artery to fill in half of the scalp defect and Full-thickness skin graft harvested from the abdomen to cover the right superior donor site. patient Tolerated the procedure well and discharged to rehab on Ceftriaxone 2 grams IV q12. Vancomycin 1 gram IV q12 + Metronidazole 500 mg PO q8hrs followed by Dr. Murillo.  I have communicated frequently with his provider at [place] Dr. Tim Causey who sent daily pictures of scalp. Postop course complicated by skin graft hematoma since the pt put on Lovenox and Aspirin and daily fitting his helmet have likely irritated the skin graft. No signs of infections no history of fall.  so he underwent a resection of damaged scalp and with full-thickness skin graft 11/8/23 and was discharged to [facility]. healing well, completed month of PT, healing appropriately.  He comes in for routine postop visit.  Abdominal donor site c/d/i A&Ox3  6/18/24: Returns TODAY for follow up and to discuss cranioplasty options with his siblings Pavan and Sayda. Remains at [facility] (@ [place]). Endorses unable to walk, wheelchair bound. Has not walked in about a year. Continuing with therapies at rehab, however family is very dissatisfied from the care, as they have not seen any progress, just sitting in chair. Was told he will never be able to walk again or back to his pre-infection baseline.   Scalp: Head shape appears asymmetrical. Scalp incision sites are well healed without erythema, dehiscence, drainage, or signs of infection. Edges are well-approximated. No other erythema. No fluctuance or palpable fluid collections. Wearing helmet when OOB.  A&Ox3, hard of hearing. Family members assist in ROS/HPI.   7/9/24: Spoke with Pio's sister Virginia via phone call. Patient still at [facility], had CT and MRI completed. Discussed risks and benefits of surgery. [facility]   Patient met with Dr. Ross on 7/30/24, d/t pt with very thin skin graft overlying the bone and the risk of dehiscence over the implant would be high, recommendation for tissue expansion on the right side with staged adjacent tissue transfer of scalp to left with excision of skin graft at the time of cranioplasty was discussed.   He is s/p subgaleal tissue expander 8/2/24.  8/20/24: Returns today for routine post op follow up. Remains at [facility] at [place]. Injected 20cc sterile saline into expander today. Discussed at today's visit with patient and family that he likely will not be a candidate for acute rehab post reconstruction.  Scalp: Head shape appears symmetrical. Nylon sutures in place along incision. Scalp incision sites are healing appropriately without erythema, dehiscence, drainage, or signs of infection. Edges are well-approximated. No other erythema. No fluctuance or palpable fluid collections. A&Ox3  Now s/p cranioplasty w/ R flank skin graft on 8/30/24. unfortunetaly d/t do delirium patient removed bolster on day 5 after skin graft resulting in hematoma at the surgical bed and expcted graft failure.  Hospital course c/b: urinary retention, paniagua placed, will plan to TOV at rehab; increased secretions, seen by pulmonology and improved with dunoebs and chest PT; agitation and removing headwrap, patient was frequently re-directed. briefly required time on mittens and 1:1 supervision, intermittent zyprexa but has since resolved.  He was discharged back to [facility] [place] with specific instructions for staff: "apply mupirocin to graft site (head) and crani incision twice daily. Graft site should be moist. Please keep head wrap in place and changed twice daily."  9/25/24: Returns today for post op check.  10/15/24: Returns today for wound check with sister Virginia. Per sister, she is unsure if they are keeping skin graft moist. local wound care applied, large black scab removed with healthy granulation tissue beneath.   s/p revision of left cranioplasty and right skin graft from abdomen on 10/17/24. Discharged back to [facility] on 10/18/24.  interim: sent to [facility] for aspiration PNA.   11/5/24: Returns today for follow up.

## 2024-10-30 NOTE — ASSESSMENT
[FreeTextEntry1] : 77 year old male with a very complex history most recently s/p wound revision with removal of hardware, craniectomy for osteomyelitis, washout of epidural and intracranial abscess Resection of damaged scalp surrounding the open foul smelling draining wound. Creation of complex left-sided pedicle scalp flap for midline rotation and Full-thickness skin graft harvested from the abdomen to cover the right superior donor site. Postop course complicated by delayed hematoma under the skin graft in the setting of helmet fitting with Aspirin and Lovenox. He is now s/p Resection of damaged and infected full-thickness skin graft 11/8/23.  More recently s/p scalp tissue expander on 8/2/24 and Titanium mesh cranioplasty, rotational flap creation and full thickness skin graft from abdomen on 8/30/24. Unfortunately course complicated by delirium and patient ripped his bolster resulting in hematoma under the skin graft. He was then discharged to [facility] that did not comply with wound care recommendations.   Most recently s/p revision of left cranioplasty and right skin graft from abdomen on 10/17/24.  He is stable and healing appropriately. He is an appropriate candidate for partial/complete suture removal today   Instructed to:   - Can leave incision open to air. - Gently wash surgical sites daily with baby shampoo and water. Pat dry. - No swimming or soaking in bathtub for 6 weeks post-operatively or until wounds have fully healed. - Avoid applying cream/ointment directly to surgical incisions for 6 weeks post-op. - Continue monitoring for signs of infection including increased pain, redness, swelling, fever (temperature > 100.4 F), or yellow/green/brown drainage. - Please call immediately with any of these symptoms. During office hours, call our office at [phone]. Call 9-1-1 for any life-threatening signs or symptoms. - Follow up with rest of medical team as advised. - Follow-up in __ weeks with Dr. Aguilera for post-op and continued suture removal.   Patient verbalized understanding and agreement with treatment plan.